# Patient Record
Sex: MALE | Race: WHITE | Employment: OTHER | URBAN - METROPOLITAN AREA
[De-identification: names, ages, dates, MRNs, and addresses within clinical notes are randomized per-mention and may not be internally consistent; named-entity substitution may affect disease eponyms.]

---

## 2019-10-14 ENCOUNTER — DOCUMENTATION ONLY (OUTPATIENT)
Dept: CARE COORDINATION | Facility: CLINIC | Age: 59
End: 2019-10-14

## 2019-10-15 NOTE — TELEPHONE ENCOUNTER
RECORDS RECEIVED FROM: Self   DATE RECEIVED: 10/16/19   NOTES (FOR ALL VISITS) STATUS DETAILS   OFFICE NOTE from referring provider N/A    OFFICE NOTE from other specialist Received Dr Aldrich @ [facility]:  14    Dr Welch @ [facility]:  14   DISCHARGE SUMMARY from hospital N/A    DISCHARGE REPORT from the ER N/A    OPERATIVE REPORT N/A    MEDICATION LIST N/A    IMAGING  (FOR ALL VISITS)     EMG N/A    EEG N/A    ECT N/A    MRI (HEAD, NECK, SPINE) N/A    LUMBAR PUNCTURE N/A    LAMONTE Scan N/A    CT (HEAD, NECK, SPINE) N/A       Action    Action Taken Sent inbasket to [facility] to see if there are additional outside records

## 2019-10-16 ENCOUNTER — OFFICE VISIT (OUTPATIENT)
Dept: NEUROLOGY | Facility: CLINIC | Age: 59
End: 2019-10-16

## 2019-10-16 ENCOUNTER — PRE VISIT (OUTPATIENT)
Dept: NEUROLOGY | Facility: CLINIC | Age: 59
End: 2019-10-16

## 2019-10-16 VITALS
HEIGHT: 69 IN | HEART RATE: 91 BPM | DIASTOLIC BLOOD PRESSURE: 88 MMHG | BODY MASS INDEX: 25.48 KG/M2 | RESPIRATION RATE: 17 BRPM | OXYGEN SATURATION: 98 % | WEIGHT: 172 LBS | SYSTOLIC BLOOD PRESSURE: 147 MMHG

## 2019-10-16 DIAGNOSIS — R25.2 MUSCLE CRAMPS: Primary | ICD-10-CM

## 2019-10-16 DIAGNOSIS — I73.00 RAYNAUD'S DISEASE WITHOUT GANGRENE: ICD-10-CM

## 2019-10-16 DIAGNOSIS — Z79.899 LONG TERM CURRENT USE OF ANTIARRHYTHMIC MEDICAL THERAPY: ICD-10-CM

## 2019-10-16 RX ORDER — MEXILETINE HYDROCHLORIDE 150 MG/1
CAPSULE ORAL
Qty: 90 CAPSULE | Refills: 0 | Status: SHIPPED | OUTPATIENT
Start: 2019-10-16 | End: 2019-11-18

## 2019-10-16 ASSESSMENT — MIFFLIN-ST. JEOR: SCORE: 1580.57

## 2019-10-16 ASSESSMENT — PAIN SCALES - GENERAL: PAINLEVEL: NO PAIN (0)

## 2019-10-16 NOTE — LETTER
10/16/2019       RE: Bari Osborn  [address] [facility] [address]     Dear Colleague,    Thank you for referring your patient, Bari Osborn, to the [facility] NEUROLOGY at [facility]. Please see a copy of my visit note below.    Service Date: 10/16/2019      Zoe Khan MD   [facility]    [address] [place] [address]      RE: Bari Osborn   MRN: [id number]    : 1960      Dear Dr. Khan:      I had the pleasure to see Berta Osborn at the [facility] today.  He was self-referred for his muscle cramps.  He has had cramps since about age 14.  Initially they were mostly exertional and limiting his ability to cycle.  He would have a hard time biking more than 20 miles because of them. They were located in his thighs and calves. Over the last 20+ years he also reports cramps at rest or with minimal exertion.  His sternocleidomastoid muscles will cramp when he yawns.  His finger muscles, especially ADM or finger flexors, will cramp when locking his fingers or gripping something and his legs will cramp when he watches TV or at night.  This has made his sleep very uncomfortable lately.  He never had myoglobinuria or darling rhabdomyolysis.  Despite the fact that his cramps were limiting his ability to bike more than 20 miles or do forced muscle contraction, he otherwise does not experience any limitations of his activities of daily living or work due to muscle weakness.  He has not noticed any atrophy of the muscles and fasciculations are scant to very rare. The cramps do not get worse with very cold or warm weather, but dehydration consistently triggers them and he has 3 or 4 bottles of water at bedside, but even after he drinks the water and he is well hydrated, waking up at night to urinate several times, cramps will not always resolve.  He has tried over-the-counter magnesium  and tonic water with limited efficacy.  He has not received any prescription medication from a physician for cramps, such as gabapentin, Lyrica, baclofen, tizanidine, carbamazepine, mexiletine and the like.      He does not report constant numbness or tingling in his feet.  He had an episode of tingling on the left foot and the left hand a number of years ago for which he was evaluated by Neurologist Dr. Welch and Valdemar in 2014, without any serious diagnosis transpiring.  He also tells me that he has tingling in the entire left arm from the shoulder down to the hand, especially thumb and index finger, which is aggravated by tilting his head to the left.  He has felt that this is consistent with left C6 radiculopathy and he did have an MRI of the cervical spine today for that reason.  He also had some symptoms of carpal tunnel syndrome and chronic Raynaud syndrome, worse on the left hand, on nifedipine treatment.  The Raynaud has been evaluated by rheumatologist and no systemic disease was found.  He does not have any signs of scleroderma such as dysphagia, calcification, skin tightening, rashes, etc. He denies dyspnea on exertion, orthopnea, nonrefreshing sleep, morning headaches, diplopia, ptosis, dysphagia, difficulty chewing, dysarthria or dysphonia, chest pain, syncope, palpitations, lower extremity edema or an unsteady gait or falls.  He has no incontinence of bowel or bladder.      CURRENT MEDICATIONS:  Reviewed and are as per Epic record.  They include nifedipine and recently Lipitor 20 mg.      PAST SURGICAL HISTORY:  Significant for Nissen fundoplication surgery which took care of the cramps for 4-6 months, but then they reemerged.  Hip surgery.      ALLERGIES:  No known drug allergies.      SOCIAL HISTORY:  No smoking or illicit drug use.  Alcohol use about 1-2 drinks a day.  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    He had an EMG 4-5 years ago by Pranav Aldrich and Yuri  which allegedly did not show any signs of muscle disease, but I do not have those results available.      FAMILY HISTORY:  Positive for similar cramps affecting his mother and his sister- which the patient figured this out recently.      PHYSICAL EXAMINATION:   VITAL SIGNS:  His blood pressure is 147/88.  Pulse is 91 and regular.  Respiratory rate 17.  O2 sat 98% on room air.  Weight is 70 kilos.  Height is 175.  He endorses no pain. I noted that he has asymmetric Raynaud syndrome affecting chiefly the left hand.    NEUROLOGIC:  He is awake, alert and oriented x3.  Cranial nerve examination II-XII are normal.  Motor exam shows normal muscle strength, tone and bulk throughout.  I did not see any percussion, hand  or EDC myotonia and there were no fasciculations or muscle atrophy. Reflexes were 2+ in biceps, triceps, brachioradialis and symmetric, 3+ in the knees and ankles with some mild spread.  There is no ankle clonus.  Toes are downgoing bilaterally.  There is no spasticity or rigidity.  His agility of finger and foot tapping is intact bilaterally.  His sensory exam is intact to vibration, joint, position, pinprick and light touch throughout without any right versus left asymmetries.  His muscle bulk is overall normal, not unusually hypertrophic.  His finger-to-nose is done without dysmetria.  He is able to rise from a chair with arms crossed on the chest without difficulties.  Casual gait as well as tandem are normal.  Romberg is negative.  He can walk on heels and toes without difficulties.      Lab workup was reviewed.  In 2014, he had double strand DNA antibodies, CEDRICK, glucose, scleroderma Garcia, SSA, SSB antibodies, CK, CRP, sed rate, Lyme serologies, TSH, magnesium levels, vitamin B12 that were all negative or normal.  Liver function tests were also intact as was CBC.     EKG today (12-lead) was normal.      IMPRESSION:  1. Idiopathic muscle cramps 2. Asymmetric Raynaud syndrome- rule out left thoracic  outlet syndrome.      I spent about 40 minutes with Dr. Osborn of which more than half was counseling.  I told him that so longstanding muscle cramps for over 40 years with a normal neurological examination, no objective weakness, and no significant restriction of exercise tolerance are unlikely to be due to any serious disorder.  I really doubt he has a muscle disease, but for my peace of mind I would like to repeat his EMG of 1 arm and 1 leg.  He does not have striking fasciculations to suggest a peripheral nerve hyperexcitability syndrome.  I told him that cramps are often idiopathic and not uncommonly run in families. All he needs is symptomatic management and not necessarily more diagnostic workup.  Because his cramps are particularly severe I proposed him to try mexiletine.  I have had quite some success with this medication in myotonic disorders but also in individuals with cramps from other etiologies.  I will start him on 150 mg b.i.d. for 2 weeks and then 150 mg t.i.d.  One month after starting the drug I asked Dr. Osborn to call me to report on the benefit or lack thereof.  If he has no benefit, but also no side effects, we may gradually increase the dose to 300 mg t.i.d.  If he has side effects, we will try an alternative and he will have to return for followup in our clinic.    It is important to repeat his EKG 1 month after starting mexiletine to evaluate the QT interval.     Lastly, I noted that his Raynaud is asymmetric. That is a bit unusual and may suggest a vascular lesion or structural neurological lesion; thoracic outlet syndrome more commonly manifests with vascular changes than cervical radiculopathy. I will look for this condition on his EMG. If I do not find anything, I may discuss this with one of our vascular surgeons.     He has a good understanding of all the above and I will see him in followup as needed.        Sincerely,       MD REGGIE Melendrez,  MD             D: 10/16/2019   T: 10/16/2019   MT: AKA      Name:     BLADE JOLLY   MRN:      [id number]        Account:      [id number]   :      1960           Service Date: 10/16/2019      Document: [id number]        Again, thank you for allowing me to participate in the care of your patient.      Sincerely,    Perico Spicer MD

## 2019-10-16 NOTE — NURSING NOTE
Chief Complaint   Patient presents with     Consult     Neuropathy     Medications reviewed and vital signs taken.   Ildefonso Kasper, ARGENIS

## 2019-10-16 NOTE — PATIENT INSTRUCTIONS
Your muscle cramps are likely idiopathic. Your neurological exam was completely normal today and my concerns about a serious muscle or nerve disease causing the cramps is very low.    That said, I would like to repeat your EMG (myself) at some point just to have the peace of mind it is normal. You can really schedule it at your convenience- it is not urgent.    Please start the drug called mexiletine after we do the EKG today. Once you have been on the t.i.d. dose for 2 weeks (so 1 month from now), I would like to hear from you.     Thank you! Please call  or send [facility] message with any questions or concerns.

## 2019-10-16 NOTE — PROGRESS NOTES
Service Date: 10/16/2019      Zoe Khan MD   [facility]    [address] [place] [address]      RE: Bari Osborn   MRN: [id number]    : 1960      Dear Dr. 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  He was self-referred for his muscle cramps.  He has had cramps since about age 14.  Initially they were mostly exertional and limiting his ability to cycle.  He would have a hard time biking more than 20 miles because of them. They were located in his thighs and calves. Over the last 20+ years he also reports cramps at rest or with minimal exertion.  His sternocleidomastoid muscles will cramp when he yawns.  His finger muscles, especially ADM or finger flexors, will cramp when locking his fingers or gripping something and his legs will cramp when he watches TV or at night.  This has made his sleep very uncomfortable lately.  He never had myoglobinuria or darling rhabdomyolysis.  Despite the fact that his cramps were limiting his ability to bike more than 20 miles or do forced muscle contraction, he otherwise does not experience any limitations of his activities of daily living or work due to muscle weakness.  He has not noticed any atrophy of the muscles and fasciculations are scant to very rare. The cramps do not get worse with very cold or warm weather, but dehydration consistently triggers them and he has 3 or 4 bottles of water at bedside, but even after he drinks the water and he is well hydrated, waking up at night to urinate several times, cramps will not always resolve.  He has tried over-the-counter magnesium and tonic water with limited efficacy.  He has not received any prescription medication from a physician for cramps, such as gabapentin, Lyrica, baclofen, tizanidine, carbamazepine, mexiletine and the like.      He does not report constant numbness or tingling in his feet.  He had an episode of  tingling on the left foot and the left hand a number of years ago for which he was evaluated by Neurologist Dr. Welch and Valdemar in 2014, without any serious diagnosis transpiring.  He also tells me that he has tingling in the entire left arm from the shoulder down to the hand, especially thumb and index finger, which is aggravated by tilting his head to the left.  He has felt that this is consistent with left C6 radiculopathy and he did have an MRI of the cervical spine today for that reason.  He also had some symptoms of carpal tunnel syndrome and chronic Raynaud syndrome, worse on the left hand, on nifedipine treatment.  The Raynaud has been evaluated by rheumatologist and no systemic disease was found.  He does not have any signs of scleroderma such as dysphagia, calcification, skin tightening, rashes, etc. He denies dyspnea on exertion, orthopnea, nonrefreshing sleep, morning headaches, diplopia, ptosis, dysphagia, difficulty chewing, dysarthria or dysphonia, chest pain, syncope, palpitations, lower extremity edema or an unsteady gait or falls.  He has no incontinence of bowel or bladder.      CURRENT MEDICATIONS:  Reviewed and are as per Epic record.  They include nifedipine and recently Lipitor 20 mg.      PAST SURGICAL HISTORY:  Significant for Nissen fundoplication surgery which took care of the cramps for 4-6 months, but then they reemerged.  Hip surgery.      ALLERGIES:  No known drug allergies.      SOCIAL HISTORY:  No smoking or illicit drug use.  Alcohol use about 1-2 drinks a day.  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    He had an EMG 4-5 years ago by Pranav Aldrich and Yuri which allegedly did not show any signs of muscle disease, but I do not have those results available.      FAMILY HISTORY:  Positive for similar cramps affecting his mother and his sister- which the patient figured this out recently.      PHYSICAL EXAMINATION:   VITAL SIGNS:  His blood pressure is  147/88.  Pulse is 91 and regular.  Respiratory rate 17.  O2 sat 98% on room air.  Weight is 70 kilos.  Height is 175.  He endorses no pain. I noted that he has asymmetric Raynaud syndrome affecting chiefly the left hand.    NEUROLOGIC:  He is awake, alert and oriented x3.  Cranial nerve examination II-XII are normal.  Motor exam shows normal muscle strength, tone and bulk throughout.  I did not see any percussion, hand  or EDC myotonia and there were no fasciculations or muscle atrophy. Reflexes were 2+ in biceps, triceps, brachioradialis and symmetric, 3+ in the knees and ankles with some mild spread.  There is no ankle clonus.  Toes are downgoing bilaterally.  There is no spasticity or rigidity.  His agility of finger and foot tapping is intact bilaterally.  His sensory exam is intact to vibration, joint, position, pinprick and light touch throughout without any right versus left asymmetries.  His muscle bulk is overall normal, not unusually hypertrophic.  His finger-to-nose is done without dysmetria.  He is able to rise from a chair with arms crossed on the chest without difficulties.  Casual gait as well as tandem are normal.  Romberg is negative.  He can walk on heels and toes without difficulties.      Lab workup was reviewed.  In 2014, he had double strand DNA antibodies, CEDRICK, glucose, scleroderma Garcia, SSA, SSB antibodies, CK, CRP, sed rate, Lyme serologies, TSH, magnesium levels, vitamin B12 that were all negative or normal.  Liver function tests were also intact as was CBC.     EKG today (12-lead) was normal.      IMPRESSION:  1. Idiopathic muscle cramps 2. Asymmetric Raynaud syndrome- rule out left thoracic outlet syndrome.      I spent about 40 minutes with Dr. Osborn of which more than half was counseling.  I told him that so longstanding muscle cramps for over 40 years with a normal neurological examination, no objective weakness, and no significant restriction of exercise tolerance are unlikely  to be due to any serious disorder.  I really doubt he has a muscle disease, but for my peace of mind I would like to repeat his EMG of 1 arm and 1 leg.  He does not have striking fasciculations to suggest a peripheral nerve hyperexcitability syndrome.  I told him that cramps are often idiopathic and not uncommonly run in families. All he needs is symptomatic management and not necessarily more diagnostic workup.  Because his cramps are particularly severe I proposed him to try mexiletine.  I have had quite some success with this medication in myotonic disorders but also in individuals with cramps from other etiologies.  I will start him on 150 mg b.i.d. for 2 weeks and then 150 mg t.i.d.  One month after starting the drug I asked Dr. Osborn to call me to report on the benefit or lack thereof.  If he has no benefit, but also no side effects, we may gradually increase the dose to 300 mg t.i.d.  If he has side effects, we will try an alternative and he will have to return for followup in our clinic.    It is important to repeat his EKG 1 month after starting mexiletine to evaluate the QT interval.     Lastly, I noted that his Raynaud is asymmetric. That is a bit unusual and may suggest a vascular lesion or structural neurological lesion; thoracic outlet syndrome more commonly manifests with vascular changes than cervical radiculopathy. I will look for this condition on his EMG. If I do not find anything, I may discuss this with one of our vascular surgeons.     He has a good understanding of all the above and I will see him in followup as needed.        Sincerely,       MD REGGIE Melendrez MD             D: 10/16/2019   T: 10/16/2019   MT: AKA      Name:     BLADE OSBORN   MRN:      [id number]        Account:      [id number]   :      1960           Service Date: 10/16/2019      Document: [id number]

## 2019-10-21 DIAGNOSIS — Z79.899 LONG TERM CURRENT USE OF ANTIARRHYTHMIC MEDICAL THERAPY: ICD-10-CM

## 2019-10-21 LAB — INTERPRETATION ECG - MUSE: NORMAL

## 2019-11-18 DIAGNOSIS — R25.2 MUSCLE CRAMPS: ICD-10-CM

## 2019-11-18 RX ORDER — MEXILETINE HYDROCHLORIDE 150 MG/1
CAPSULE ORAL
Qty: 90 CAPSULE | Refills: 1 | Status: SHIPPED | OUTPATIENT
Start: 2019-11-18 | End: 2020-02-07

## 2019-11-18 NOTE — TELEPHONE ENCOUNTER
Rx Authorization:    Requested Medication/ Dose mexiletine (MEXITIL) 150 MG capsule    Date last refill ordered: 10/16/19    Quantity ordered: 90    # refills: 0    Date of last clinic visit with ordering provider: 10/16/19    Date of next clinic visit with ordering provider: None Scheduled     All pertinent protocol data (lab date/result):     Include pertinent information from patients message:

## 2020-02-04 DIAGNOSIS — R25.2 MUSCLE CRAMPS: ICD-10-CM

## 2020-02-04 NOTE — TELEPHONE ENCOUNTER
Rx Authorization:    Requested Medication/ Dose:150mg    Date last refill ordered: 2/4/19    Quantity ordered: 90caps    # refills: 1    Date of last clinic visit with ordering provider: 10/16/19    Date of next clinic visit with ordering provider: f/u 1 year    All pertinent protocol data (lab date/result):     Include pertinent information from patients message:

## 2020-02-07 RX ORDER — MEXILETINE HYDROCHLORIDE 150 MG/1
CAPSULE ORAL
Qty: 90 CAPSULE | Refills: 1 | Status: SHIPPED | OUTPATIENT
Start: 2020-02-07